# Patient Record
Sex: MALE | Race: WHITE
[De-identification: names, ages, dates, MRNs, and addresses within clinical notes are randomized per-mention and may not be internally consistent; named-entity substitution may affect disease eponyms.]

---

## 2017-09-29 ENCOUNTER — HOSPITAL ENCOUNTER (OUTPATIENT)
Dept: HOSPITAL 53 - M CR | Age: 72
LOS: 1 days | End: 2017-09-30
Attending: INTERNAL MEDICINE
Payer: MEDICARE

## 2017-09-29 DIAGNOSIS — Z98.61: ICD-10-CM

## 2017-09-29 DIAGNOSIS — Z51.89: Primary | ICD-10-CM

## 2017-09-29 DIAGNOSIS — I25.10: ICD-10-CM

## 2017-10-02 ENCOUNTER — HOSPITAL ENCOUNTER (OUTPATIENT)
Dept: HOSPITAL 53 - M CR | Age: 72
LOS: 29 days | End: 2017-10-31
Attending: INTERNAL MEDICINE
Payer: MEDICARE

## 2017-10-02 DIAGNOSIS — Z98.61: ICD-10-CM

## 2017-10-02 DIAGNOSIS — I25.10: ICD-10-CM

## 2017-10-02 DIAGNOSIS — Z51.89: Primary | ICD-10-CM

## 2019-12-29 ENCOUNTER — HOSPITAL ENCOUNTER (EMERGENCY)
Dept: HOSPITAL 53 - M ED | Age: 74
Discharge: HOME | End: 2019-12-29
Payer: MEDICARE

## 2019-12-29 VITALS — HEIGHT: 68 IN | WEIGHT: 178.35 LBS | BODY MASS INDEX: 27.03 KG/M2

## 2019-12-29 VITALS — SYSTOLIC BLOOD PRESSURE: 136 MMHG | DIASTOLIC BLOOD PRESSURE: 83 MMHG

## 2019-12-29 DIAGNOSIS — I11.9: ICD-10-CM

## 2019-12-29 DIAGNOSIS — D64.9: ICD-10-CM

## 2019-12-29 DIAGNOSIS — M50.30: ICD-10-CM

## 2019-12-29 DIAGNOSIS — Z88.2: ICD-10-CM

## 2019-12-29 DIAGNOSIS — Z79.82: ICD-10-CM

## 2019-12-29 DIAGNOSIS — G47.30: ICD-10-CM

## 2019-12-29 DIAGNOSIS — I45.10: ICD-10-CM

## 2019-12-29 DIAGNOSIS — I21.9: ICD-10-CM

## 2019-12-29 DIAGNOSIS — M25.78: ICD-10-CM

## 2019-12-29 DIAGNOSIS — Z98.61: ICD-10-CM

## 2019-12-29 DIAGNOSIS — Z79.84: ICD-10-CM

## 2019-12-29 DIAGNOSIS — M47.812: ICD-10-CM

## 2019-12-29 DIAGNOSIS — Z79.899: ICD-10-CM

## 2019-12-29 DIAGNOSIS — R29.818: Primary | ICD-10-CM

## 2019-12-29 LAB
APTT BLD: 28 SECONDS (ref 25–38.4)
BASOPHILS # BLD AUTO: 0 10^3/UL (ref 0–0.2)
BASOPHILS NFR BLD AUTO: 0.2 % (ref 0–1)
CK MB CFR.DF SERPL CALC: 3.01
CK MB SERPL-MCNC: 2.2 NG/ML (ref ?–3.6)
CK SERPL-CCNC: 73 U/L (ref 39–308)
EOSINOPHIL # BLD AUTO: 0.2 10^3/UL (ref 0–0.5)
EOSINOPHIL NFR BLD AUTO: 4 % (ref 0–3)
HCT VFR BLD AUTO: 41.9 % (ref 42–52)
HGB BLD-MCNC: 13.5 G/DL (ref 13.5–17.5)
INR PPP: 1.06
LYMPHOCYTES # BLD AUTO: 0.8 10^3/UL (ref 1.5–5)
LYMPHOCYTES NFR BLD AUTO: 18.3 % (ref 24–44)
MCH RBC QN AUTO: 29 PG (ref 27–33)
MCHC RBC AUTO-ENTMCNC: 32.2 G/DL (ref 32–36.5)
MCV RBC AUTO: 90.1 FL (ref 80–96)
MONOCYTES # BLD AUTO: 0.4 10^3/UL (ref 0–0.8)
MONOCYTES NFR BLD AUTO: 8.8 % (ref 0–5)
NEUTROPHILS # BLD AUTO: 3.1 10^3/UL (ref 1.5–8.5)
NEUTROPHILS NFR BLD AUTO: 68.5 % (ref 36–66)
PLATELET # BLD AUTO: 135 10^3/UL (ref 150–450)
PROTHROMBIN TIME: 13.6 SECONDS (ref 11.8–14)
RBC # BLD AUTO: 4.65 10^6/UL (ref 4.3–6.1)
TROPONIN I SERPL-MCNC: < 0.02 NG/ML (ref ?–0.1)
WBC # BLD AUTO: 4.5 10^3/UL (ref 4–10)

## 2019-12-29 PROCEDURE — 86900 BLOOD TYPING SEROLOGIC ABO: CPT

## 2019-12-29 PROCEDURE — 80047 BASIC METABLC PNL IONIZED CA: CPT

## 2019-12-29 PROCEDURE — 85610 PROTHROMBIN TIME: CPT

## 2019-12-29 PROCEDURE — 70551 MRI BRAIN STEM W/O DYE: CPT

## 2019-12-29 PROCEDURE — 93005 ELECTROCARDIOGRAM TRACING: CPT

## 2019-12-29 PROCEDURE — 72125 CT NECK SPINE W/O DYE: CPT

## 2019-12-29 PROCEDURE — 85025 COMPLETE CBC W/AUTO DIFF WBC: CPT

## 2019-12-29 PROCEDURE — 85730 THROMBOPLASTIN TIME PARTIAL: CPT

## 2019-12-29 PROCEDURE — 99284 EMERGENCY DEPT VISIT MOD MDM: CPT

## 2019-12-29 PROCEDURE — 84484 ASSAY OF TROPONIN QUANT: CPT

## 2019-12-29 PROCEDURE — 82553 CREATINE MB FRACTION: CPT

## 2019-12-29 PROCEDURE — 36415 COLL VENOUS BLD VENIPUNCTURE: CPT

## 2019-12-29 PROCEDURE — 82550 ASSAY OF CK (CPK): CPT

## 2019-12-29 PROCEDURE — 86850 RBC ANTIBODY SCREEN: CPT

## 2019-12-29 PROCEDURE — 86901 BLOOD TYPING SEROLOGIC RH(D): CPT

## 2019-12-29 PROCEDURE — 70498 CT ANGIOGRAPHY NECK: CPT

## 2019-12-29 PROCEDURE — 71046 X-RAY EXAM CHEST 2 VIEWS: CPT

## 2019-12-29 PROCEDURE — 94760 N-INVAS EAR/PLS OXIMETRY 1: CPT

## 2019-12-29 PROCEDURE — 70450 CT HEAD/BRAIN W/O DYE: CPT

## 2019-12-29 NOTE — REP
Clinical: Sensory deficit

 

Comparison: none.

 

Findings:

The ventricles, sulci, and cisterns are normal in position and appearance.

Gray-white differentiation is maintained.  No acute intracranial hemorrhage,

mass/mass effect, pathology or trauma/injury.  No evidence for acute infarction.

No extra-axial fluid collection.  Calvarium is intact.  Paranasal sinuses and

mastoid air cells are clear.

 

Impression:

Normal noncontrast head CT.

No evidence for acute intracranial pathology or trauma/injury.

 

 

Electronically Signed by

Brayden Diaz MD 12/29/2019 09:48 A

## 2019-12-29 NOTE — REP
Clinical:  Sensorimotor deficit .

 

Comparison: None .

 

Technique:  PA and lateral.

 

Findings:

The mediastinum and cardiac silhouette are normal.  The lung fields are clear and

without acute consolidation, effusion, or pneumothorax.  The skeletal structures

are intact and normal.

 

Impression:

1.   No acute cardiopulmonary process.

 

 

Electronically Signed by

Brayden Diaz MD 12/29/2019 09:42 A

## 2019-12-29 NOTE — REPVR
PROCEDURE INFORMATION: 

Exam: MR Head Without Contrast 

Exam date and time: 12/29/2019 12:41 PM 

Age: 74 years old 

Clinical indication: Weakness, extremity; Right; Additional info: R arm 

sensorimotor deficit 



TECHNIQUE: 

Imaging protocol: MR of the head without contrast. 



COMPARISON: 

CT Head without contrast 12/29/2019 9:27 AM 



FINDINGS: 

Brain: A few T2 hyperintense foci are noted in the cerebral white matter 

bilaterally. Age-related mild bilateral cerebral atrophy. 

Ventricles: No hydrocephalus or evidence of increased intracranial pressure.  

Bones/joints: Unremarkable. 

Soft tissues: Unremarkable. 

Sinuses: Normal as visualized. No acute sinusitis. 

Mastoid air cells: Normal as visualized. No mastoid effusion. 

Orbits: Bilateral prior cataract surgery. 





IMPRESSION: 

1. Mild chronic microvascular ischemic white matter disease. 

2. No acute intracranial abnormality identified. 



Electronically signed by: Prashant Adorno On 12/29/2019  13:06:07 PM

## 2019-12-29 NOTE — ECGEPIP
[facility] - ED

                                       

                                       Test Date:    2019

Pat Name:     SAMUEL BELLE          Department:   

Patient ID:   [id number]                 Room:         -

Gender:       Male                     Technician:   SACHIN

:          1945               Requested By: Shayla SARKAR PA-C

Order Number: [id number]     Reading MD:   Ilir Bhatti

                                 Measurements

Intervals                              Axis          

Rate:         78                       P:            50

NY:           173                      QRS:          11

QRSD:         158                      T:            -13

QT:           407                                    

QTc:          464                                    

                           Interpretive Statements

SINUS RHYTHM

RIGHT BUNDLE BRANCH BLOCK

NO PRIORS FOR COMPARISON

Electronically Signed on 2019 15:51:25 EST by Ilir Bhatti

## 2019-12-29 NOTE — REP
INDICATION:  Right upper extremity sensorimotor deficit.

 

PROCEDURE:  Axial contrast enhanced images from the the mid skull to the thoracic

inlet using angiographic protocol with 100 ml Isovue 370 intravenous contrast

material.  Coronal and sagittal multiplanar re-formations, volume rendered 3-D

MPR and MIP re-formations obtained

 

COMPARISON STUDIES:  None.

 

FINDINGS:  There is no evidence of dissection or acute bleed. The common carotid

arteries, internal and external carotid arteries, and vertebral arteries are

patent without evidence of stenosis. No other abnormalities are identified.

 

CONCLUSION:  Normal CT angiogram of the neck.

 

 

Electronically Signed by

Brayden Diaz MD 12/29/2019 10:38 A

## 2019-12-29 NOTE — REP
Clinical:  Right upper extremity numbness/weakness.

 

Technique:  Axial noncontrast images from the skull base to the thoracic inlet

with coronal and sagittal re-formations.

 

Findings:

Alignment and lordosis maintained.  No acute fracture / compression injury or

subluxation.  Generalized age-related degenerative changes include subtle

spurring/osteophyte formation along with minimal endplate sclerosis, disc space

narrowing and facet arthropathy primarily involving C5-6 and C6-7.  Spinal canal

appears patent.  Paravertebral soft tissues are normal.

 

Impression:

Generalized multilevel age-related degenerative changes.

 

 

Electronically Signed by

Brayden Diaz MD 12/29/2019 09:57 A